# Patient Record
Sex: FEMALE | URBAN - METROPOLITAN AREA
[De-identification: names, ages, dates, MRNs, and addresses within clinical notes are randomized per-mention and may not be internally consistent; named-entity substitution may affect disease eponyms.]

---

## 2021-01-23 ENCOUNTER — APPOINTMENT (RX ONLY)
Dept: URBAN - METROPOLITAN AREA CLINIC 1 | Facility: CLINIC | Age: 46
Setting detail: DERMATOLOGY
End: 2021-01-23

## 2021-01-23 DIAGNOSIS — L81.0 POSTINFLAMMATORY HYPERPIGMENTATION: ICD-10-CM

## 2021-01-23 DIAGNOSIS — L71.0 PERIORAL DERMATITIS: ICD-10-CM

## 2021-01-23 PROCEDURE — ? PRESCRIPTION

## 2021-01-23 PROCEDURE — ? COUNSELING

## 2021-01-23 PROCEDURE — ? ADDITIONAL NOTES

## 2021-01-23 PROCEDURE — 99203 OFFICE O/P NEW LOW 30 MIN: CPT

## 2021-01-23 PROCEDURE — ? TREATMENT REGIMEN

## 2021-01-23 RX ORDER — AZELAIC ACID 0.15 G/G
GEL TOPICAL QD
Qty: 1 | Refills: 3 | Status: ERX | COMMUNITY
Start: 2021-01-23

## 2021-01-23 RX ORDER — PIMECROLIMUS 10 MG/G
CREAM TOPICAL BID
Qty: 1 | Refills: 2 | Status: ACTIVE

## 2021-01-23 RX ADMIN — AZELAIC ACID: 0.15 GEL TOPICAL at 00:00

## 2021-01-23 ASSESSMENT — LOCATION ZONE DERM: LOCATION ZONE: FACE

## 2021-01-23 ASSESSMENT — LOCATION DETAILED DESCRIPTION DERM
LOCATION DETAILED: LEFT INFERIOR CENTRAL MALAR CHEEK
LOCATION DETAILED: RIGHT INFERIOR MEDIAL MALAR CHEEK
LOCATION DETAILED: SUBMENTAL CHIN

## 2021-01-23 ASSESSMENT — LOCATION SIMPLE DESCRIPTION DERM
LOCATION SIMPLE: LEFT CHEEK
LOCATION SIMPLE: RIGHT CHEEK
LOCATION SIMPLE: SUBMENTAL CHIN

## 2021-01-23 NOTE — PROCEDURE: TREATMENT REGIMEN
Initiate Treatment: Finacea- Appy to face QHS\\nElidel- Apply to affected areas on face BID
Detail Level: Zone

## 2021-01-23 NOTE — HPI: SKIN LESION
What Type Of Note Output Would You Prefer (Optional)?: Standard Output
How Severe Is Your Skin Lesion?: moderate
Has Your Skin Lesion Been Treated?: not been treated
Is This A New Presentation, Or A Follow-Up?: Skin Lesions
Additional History: Pt states she has spots on her face. They appear to be dark spots from acne, she states they become white heads, fill up, pop, and scar.

## 2021-01-23 NOTE — HPI: RASH (ECZEMA)
How Severe Is Your Eczema?: moderate
Is This A New Presentation, Or A Follow-Up?: Rash
Additional History: Pt states she may have eczema on her cheeks and chin. She uses lotion to moisturize. She applies it twice a day.

## 2021-01-28 ENCOUNTER — RX ONLY (OUTPATIENT)
Age: 46
Setting detail: RX ONLY
End: 2021-01-28

## 2021-01-28 RX ORDER — AZELAIC ACID 0.15 G/G
GEL TOPICAL QD
Qty: 1 | Refills: 3 | Status: ERX | COMMUNITY
Start: 2021-01-28

## 2021-05-22 ENCOUNTER — APPOINTMENT (RX ONLY)
Dept: URBAN - METROPOLITAN AREA CLINIC 1 | Facility: CLINIC | Age: 46
Setting detail: DERMATOLOGY
End: 2021-05-22

## 2021-05-22 DIAGNOSIS — L81.0 POSTINFLAMMATORY HYPERPIGMENTATION: ICD-10-CM | Status: IMPROVED

## 2021-05-22 DIAGNOSIS — L71.0 PERIORAL DERMATITIS: ICD-10-CM | Status: UNCHANGED

## 2021-05-22 DIAGNOSIS — L20.89 OTHER ATOPIC DERMATITIS: ICD-10-CM | Status: INADEQUATELY CONTROLLED

## 2021-05-22 PROBLEM — L20.84 INTRINSIC (ALLERGIC) ECZEMA: Status: ACTIVE | Noted: 2021-05-22

## 2021-05-22 PROCEDURE — ? COUNSELING

## 2021-05-22 PROCEDURE — ? ADDITIONAL NOTES

## 2021-05-22 PROCEDURE — ? PRESCRIPTION

## 2021-05-22 PROCEDURE — ? TREATMENT REGIMEN

## 2021-05-22 PROCEDURE — ? PRESCRIPTION MEDICATION MANAGEMENT

## 2021-05-22 PROCEDURE — 99214 OFFICE O/P EST MOD 30 MIN: CPT

## 2021-05-22 RX ORDER — SPIRONOLACTONE 100 MG/1
TABLET, FILM COATED ORAL
Qty: 30 | Refills: 2 | Status: ERX | COMMUNITY
Start: 2021-05-22

## 2021-05-22 RX ORDER — TRIAMCINOLONE ACETONIDE 1 MG/G
OINTMENT TOPICAL
Qty: 1 | Refills: 2 | Status: ERX | COMMUNITY
Start: 2021-05-22

## 2021-05-22 RX ADMIN — TRIAMCINOLONE ACETONIDE: 1 OINTMENT TOPICAL at 00:00

## 2021-05-22 RX ADMIN — SPIRONOLACTONE: 100 TABLET, FILM COATED ORAL at 00:00

## 2021-05-22 ASSESSMENT — LOCATION DETAILED DESCRIPTION DERM
LOCATION DETAILED: RIGHT MEDIAL TRAPEZIAL NECK
LOCATION DETAILED: RIGHT INFERIOR MEDIAL MALAR CHEEK
LOCATION DETAILED: SUBMENTAL CHIN
LOCATION DETAILED: LEFT INFERIOR CENTRAL MALAR CHEEK

## 2021-05-22 ASSESSMENT — LOCATION SIMPLE DESCRIPTION DERM
LOCATION SIMPLE: POSTERIOR NECK
LOCATION SIMPLE: SUBMENTAL CHIN
LOCATION SIMPLE: LEFT CHEEK
LOCATION SIMPLE: RIGHT CHEEK

## 2021-05-22 ASSESSMENT — INVESTIGATOR STATIC GLOBAL ASSESSMENT
IN YOUR EXPERIENCE, AMONG ALL PATIENTS YOU HAVE SEEN WITH THIS CONDITION, HOW SEVERE IS THIS PATIENT'S CONDITION?: MILD TO MODERATE

## 2021-05-22 ASSESSMENT — LOCATION ZONE DERM
LOCATION ZONE: FACE
LOCATION ZONE: NECK

## 2021-05-22 NOTE — PROCEDURE: PRESCRIPTION MEDICATION MANAGEMENT
Initiate Treatment: Triamcinolone ointment- apply to face BID after moisturizer prn for flares
Detail Level: Zone
Render In Strict Bullet Format?: No

## 2021-05-22 NOTE — PROCEDURE: TREATMENT REGIMEN
Initiate Treatment: Finacea- Appy to face QHS
Detail Level: Zone
Continue Regimen: Elidel- Apply to affected areas on face BID

## 2021-06-16 ENCOUNTER — RX ONLY (OUTPATIENT)
Age: 46
Setting detail: RX ONLY
End: 2021-06-16

## 2021-06-16 RX ORDER — PIMECROLIMUS 10 MG/G
CREAM TOPICAL
Qty: 1 | Refills: 1 | Status: ERX | COMMUNITY
Start: 2021-06-16

## 2021-07-31 ENCOUNTER — APPOINTMENT (RX ONLY)
Dept: URBAN - METROPOLITAN AREA CLINIC 1 | Facility: CLINIC | Age: 46
Setting detail: DERMATOLOGY
End: 2021-07-31

## 2021-07-31 DIAGNOSIS — L71.0 PERIORAL DERMATITIS: ICD-10-CM | Status: INADEQUATELY CONTROLLED

## 2021-07-31 PROCEDURE — ? COUNSELING

## 2021-07-31 PROCEDURE — ? TREATMENT REGIMEN

## 2021-07-31 PROCEDURE — ? PRESCRIPTION

## 2021-07-31 PROCEDURE — 99214 OFFICE O/P EST MOD 30 MIN: CPT

## 2021-07-31 RX ORDER — DOXYCYCLINE HYCLATE 100 MG/1
CAPSULE, GELATIN COATED ORAL
Qty: 60 | Refills: 1 | Status: ERX | COMMUNITY
Start: 2021-07-31

## 2021-07-31 RX ADMIN — DOXYCYCLINE HYCLATE: 100 CAPSULE, GELATIN COATED ORAL at 00:00

## 2021-07-31 ASSESSMENT — LOCATION SIMPLE DESCRIPTION DERM
LOCATION SIMPLE: RIGHT CHEEK
LOCATION SIMPLE: SUBMENTAL CHIN
LOCATION SIMPLE: LEFT CHEEK

## 2021-07-31 ASSESSMENT — LOCATION ZONE DERM: LOCATION ZONE: FACE

## 2021-07-31 ASSESSMENT — LOCATION DETAILED DESCRIPTION DERM
LOCATION DETAILED: SUBMENTAL CHIN
LOCATION DETAILED: RIGHT INFERIOR MEDIAL MALAR CHEEK
LOCATION DETAILED: LEFT INFERIOR CENTRAL MALAR CHEEK

## 2021-07-31 NOTE — PROCEDURE: TREATMENT REGIMEN
Initiate Treatment: Finacea- Appy to face QHS
Detail Level: Zone
Continue Regimen: Elidel- Apply to affected areas on face BID
Modify Regimen: Hold off on spironolactone for now.

## 2022-02-09 ENCOUNTER — PREPPED CHART (OUTPATIENT)
Dept: URBAN - METROPOLITAN AREA CLINIC 98 | Facility: CLINIC | Age: 47
End: 2022-02-09

## 2022-02-09 PROBLEM — H43.823 VITREOMACULAR ADHESION: Status: STABILIZING | Noted: 2022-02-09 | Resolved: 2022-02-09

## 2022-02-09 PROBLEM — H35.61 RETINAL HEMORRHAGE: Status: RESOLVED | Noted: 2022-02-09 | Resolved: 2022-02-09

## 2022-02-09 PROBLEM — H43.823 VITREOMACULAR ADHESION: Noted: 2022-02-09

## 2022-02-09 PROBLEM — H35.033 HYPERTENSIVE RETINOPATHY: Noted: 2022-02-09

## 2022-02-09 PROBLEM — H35.463 WHITE WITHOUT PRESSURE: Noted: 2022-02-09 | Resolved: 2022-02-09

## 2022-02-09 PROBLEM — H35.463 WHITE WITHOUT PRESSURE: Noted: 2022-02-09

## 2022-02-09 PROBLEM — H35.61 RETINAL HEMORRHAGE: Noted: 2022-02-09

## 2022-02-09 PROBLEM — H35.033 HYPERTENSIVE RETINOPATHY: Status: STABILIZING | Noted: 2022-02-09 | Resolved: 2022-02-09

## 2022-10-14 ASSESSMENT — TONOMETRY
OS_IOP_MMHG: 18
OD_IOP_MMHG: 17

## 2022-10-14 ASSESSMENT — VISUAL ACUITY
OS_SC: 20/25
OD_SC: 20/30

## 2023-07-18 ENCOUNTER — FOLLOW UP (OUTPATIENT)
Dept: URBAN - METROPOLITAN AREA CLINIC 98 | Facility: CLINIC | Age: 48
End: 2023-07-18

## 2023-07-18 DIAGNOSIS — H35.463: ICD-10-CM

## 2023-07-18 DIAGNOSIS — H43.823: ICD-10-CM

## 2023-07-18 DIAGNOSIS — H35.033: ICD-10-CM

## 2023-07-18 PROCEDURE — 92014 COMPRE OPH EXAM EST PT 1/>: CPT

## 2023-07-18 PROCEDURE — 92201 OPSCPY EXTND RTA DRAW UNI/BI: CPT

## 2023-07-18 PROCEDURE — 92134 CPTRZ OPH DX IMG PST SGM RTA: CPT

## 2023-07-18 ASSESSMENT — TONOMETRY
OD_IOP_MMHG: 15
OS_IOP_MMHG: 16

## 2023-07-18 ASSESSMENT — VISUAL ACUITY
OD_SC: 20/30-2
OS_SC: 20/30-1